# Patient Record
Sex: MALE | Race: WHITE | NOT HISPANIC OR LATINO | URBAN - METROPOLITAN AREA
[De-identification: names, ages, dates, MRNs, and addresses within clinical notes are randomized per-mention and may not be internally consistent; named-entity substitution may affect disease eponyms.]

---

## 2017-06-14 ENCOUNTER — PREPPED CHART (OUTPATIENT)
Dept: URBAN - METROPOLITAN AREA CLINIC 39 | Facility: CLINIC | Age: 27
End: 2017-06-14

## 2018-06-20 ENCOUNTER — PREPPED CHART (OUTPATIENT)
Dept: URBAN - METROPOLITAN AREA CLINIC 39 | Facility: CLINIC | Age: 28
End: 2018-06-20

## 2019-06-19 ENCOUNTER — ESTABLISHED COMPREHENSIVE EXAM (OUTPATIENT)
Dept: URBAN - METROPOLITAN AREA CLINIC 39 | Facility: CLINIC | Age: 29
End: 2019-06-19

## 2019-06-19 DIAGNOSIS — H10.45: ICD-10-CM

## 2019-06-19 PROCEDURE — 92015 DETERMINE REFRACTIVE STATE: CPT

## 2019-06-19 PROCEDURE — 92014 COMPRE OPH EXAM EST PT 1/>: CPT

## 2019-06-19 ASSESSMENT — VISUAL ACUITY
OD_SC: 20/20
OS_SC: 20/20
OS_SC: J1+
OD_SC: J1+

## 2019-06-19 ASSESSMENT — TONOMETRY
OD_IOP_MMHG: 16
OS_IOP_MMHG: 14

## 2021-06-07 NOTE — PROCEDURE NOTE: CLINICAL
PROCEDURE NOTE: Probing of Lacrimal Canaliculi, With or Without Irrigation OS > OD. Diagnosis: Epiphora. Prep: Betadine Flush. Risks, benefits and alternatives discussed. The patient desires to proceed with probe and irrigation of the involved puncta today and the puncta/lacrimal system was found to be patent/blocked. See chart plan notes for further discussion. Patient tolerated the procedure well and left in good condition. Marisa King

## 2021-06-07 NOTE — PATIENT DISCUSSION
Recommend Both lower lid ectropion repair with canthoplasty with Dr. Noe Valdez in [facility].  Start with OS first , then OD.

## 2021-07-01 NOTE — PATIENT DISCUSSION
Recommend Both lower lid ectropion repair with canthoplasty with Dr. Estevan Khanna in [facility].  Start with OS first , then OD.

## 2021-07-12 NOTE — PATIENT DISCUSSION
Recommend Both lower lid ectropion repair with canthoplasty with Dr. Yo Seen in [facility].  Start with OS first , then OD.

## 2021-07-21 NOTE — PATIENT DISCUSSION
Recommend Right lower lid ectropion repair with canthoplasty. let LLL heal for at least 1 month before having RLL ectropion repair per [facility]. in 1 month pt states he will call or when he feels he is ready to do RLL ectropion repair.

## 2021-07-21 NOTE — PATIENT DISCUSSION
Recommend Both lower lid ectropion repair with canthoplasty with Dr. Shannan Cuellar in [facility].  Start with OS first , then OD.